# Patient Record
Sex: FEMALE | Race: WHITE | URBAN - METROPOLITAN AREA
[De-identification: names, ages, dates, MRNs, and addresses within clinical notes are randomized per-mention and may not be internally consistent; named-entity substitution may affect disease eponyms.]

---

## 2018-07-23 ENCOUNTER — OFFICE VISIT (OUTPATIENT)
Dept: DERMATOLOGY | Facility: CLINIC | Age: 53
End: 2018-07-23
Payer: COMMERCIAL

## 2018-07-23 VITALS — OXYGEN SATURATION: 99 % | HEART RATE: 59 BPM | SYSTOLIC BLOOD PRESSURE: 125 MMHG | DIASTOLIC BLOOD PRESSURE: 81 MMHG

## 2018-07-23 DIAGNOSIS — D23.30 FIBROUS PAPULE OF FACE: ICD-10-CM

## 2018-07-23 DIAGNOSIS — D22.9 MULTIPLE BENIGN NEVI: ICD-10-CM

## 2018-07-23 DIAGNOSIS — D48.5 NEOPLASM OF UNCERTAIN BEHAVIOR OF SKIN: Primary | ICD-10-CM

## 2018-07-23 DIAGNOSIS — R21 RASH AND OTHER NONSPECIFIC SKIN ERUPTION: ICD-10-CM

## 2018-07-23 PROCEDURE — 11101 HC BIOPSY SKIN/SUBQ/MUC MEM, EACH ADDTL LESION: CPT | Performed by: PHYSICIAN ASSISTANT

## 2018-07-23 PROCEDURE — 88305 TISSUE EXAM BY PATHOLOGIST: CPT | Mod: TC | Performed by: PHYSICIAN ASSISTANT

## 2018-07-23 PROCEDURE — 99203 OFFICE O/P NEW LOW 30 MIN: CPT | Mod: 25 | Performed by: PHYSICIAN ASSISTANT

## 2018-07-23 PROCEDURE — 11100 HC BIOPSY SKIN/SUBQ/MUC MEM, SINGLE LESION: CPT | Performed by: PHYSICIAN ASSISTANT

## 2018-07-23 RX ORDER — CEPHALEXIN 500 MG/1
500 CAPSULE ORAL
COMMUNITY
Start: 2018-07-16

## 2018-07-23 RX ORDER — ATENOLOL 50 MG/1
50 TABLET ORAL
COMMUNITY
Start: 2016-06-10

## 2018-07-23 NOTE — PROGRESS NOTES
HPI:  Natalie Molina is a 53 year old female patient here today for for rash on right elbow and right knee  Duration: 2 weeks  Symptoms:  Itchy and red. 3rd episode, lasts for 2-3 weeks.     Previous treatments: resolves with topical TMC and cephalexin x 2 weeks     Alleviating/aggravating factors: no additional    Associated symptoms: none  Additional findings:no change in products or vacations  Patient has no other skin complaints today.  Remainder of the HPI, Meds, PMH, Allergies, FH, and SH was reviewed in chart.      History reviewed. No pertinent past medical history.    History reviewed. No pertinent surgical history.     History reviewed. No pertinent family history.    Social History     Social History     Marital status:      Spouse name: N/A     Number of children: N/A     Years of education: N/A     Occupational History     Not on file.     Social History Main Topics     Smoking status: Never Smoker     Smokeless tobacco: Never Used     Alcohol use Not on file     Drug use: Not on file     Sexual activity: Not on file     Other Topics Concern     Not on file     Social History Narrative       Outpatient Encounter Prescriptions as of 7/23/2018   Medication Sig Dispense Refill     atenolol (TENORMIN) 50 MG tablet Take 50 mg by mouth       cephALEXin (KEFLEX) 500 MG capsule Take 500 mg by mouth       metFORMIN (GLUCOPHAGE) 500 MG tablet Take 500 mg by mouth       cyclobenzaprine (FLEXERIL) 10 MG tablet Take 1 tablet by mouth nightly as needed for muscle spasms. (Patient not taking: Reported on 7/23/2018) 30 tablet 0     hydrocodone-acetaminophen 5-325 MG per tablet Take  by mouth every 6 hours as needed for pain. ONE OR TWO (Patient not taking: Reported on 7/23/2018) 30 tablet 0     NO ACTIVE MEDICATIONS        No facility-administered encounter medications on file as of 7/23/2018.        Review Of Systems:  Skin: As above  Eyes: negative  Ears/Nose/Throat: negative  Respiratory: No shortness of  breath, dyspnea on exertion, cough, or hemoptysis  Cardiovascular: negative  Gastrointestinal: negative  Genitourinary: negative  Musculoskeletal: negative  Neurologic: negative  Psychiatric: negative  Hematologic/Lymphatic/Immunologic: negative  Endocrine: negative      Objective:     /81  Pulse 59  SpO2 99%  Eyes: Conjunctivae/lids: Normal   ENT: Lips:  Normal  MSK: Normal  Cardiovascular: Peripheral edema none  Pulm: Breathing Normal  Neuro/Psych: Orientation: Normal; Mood/Affect: Normal, NAD, WDWN  Following areas examined: face, groin, right and left UE  Leblanc skin type:l   Findings:    1) pink scaly patches on left medial elbow   2) flesh colored pedunculated soft nodule on left medial superior thigh 0.7  3-4) Well circumscribed brown and brownish papules with symmetric color distribution on face.  Assessment and Plan:  1) Rash and nonspecific skin eruption on right medial elbow  bx 0.4cm Contact derm vs arthropod bites vs urticaria  PUNCH BIOPSY:  After consent, anesthesia with LEC and prep, punch biopsy with a 4mm punch performed. Ethilon 4.0 green microfilament suture used for closure. No complications and routine wound care.  May grow back and will get a scar. Based on lesion type may need to completely remove lesion. Patient will be notified in 7-10 days of results. Wound care directions given. All questions were answered. One cruciate stitch for closure.   2) Neoplasm of uncertain behavior on left medial superior thigh 0.7cm  Rule out irritated acrochordon  TANGENTIAL BIOPSY:  After consent, anesthesia with LEC and prep, tangential biopsy performed.  No complications and routine wound care.  May grow back and will get a scar. Based on lesion type may need to completely remove lesion. Patient will be notified in 7-10 days of results. Wound care directions given.  3-4) multiple benign nevi and fibrous papule  Treatment of these lesions would be purely cosmetic and not medically neccessary.    Different removal options including excision, cryotherapy, cautery and /or laser.      Follow up in yearly FBE

## 2018-07-23 NOTE — MR AVS SNAPSHOT
After Visit Summary   7/23/2018    Natalie Molina    MRN: [id number]           Patient Information     Date Of Birth          1965        Visit Information        Provider Department      7/23/2018 3:00 PM Virginia Up PA-C [facility]        Today's Diagnoses     Neoplasm of uncertain behavior of skin    -  1      Care Instructions                 Wound Care Instructions     FOR SUPERFICIAL WOUNDS     [facility] [phone]    [facility][1] [phone]          AFTER 24 HOURS YOU SHOULD REMOVE THE BANDAGE AND BEGIN DAILY DRESSING CHANGES AS FOLLOWS:     1) Remove Dressing.     2) Clean and dry the area with tap water using a Q-tip or sterile gauze pad.     3) Apply Vaseline, Aquaphor, Polysporin ointment or Bacitracin ointment over entire wound.  Do NOT use Neosporin ointment.     4) Cover the wound with a band-aid, or a sterile non-stick gauze pad and micropore paper tape      REPEAT THESE INSTRUCTIONS AT LEAST ONCE A DAY UNTIL THE WOUND HAS COMPLETELY HEALED.    It is an old wives tale that a wound heals better when it is exposed to air and allowed to dry out. The wound will heal faster with a better cosmetic result if it is kept moist with ointment and covered with a bandage.    **Do not let the wound dry out.**      Supplies Needed:      *Cotton tipped applicators (Q-tips)    *Polysporin Ointment or Bacitracin Ointment (NOT NEOSPORIN)    *Band-aids or non-stick gauze pads and micropore paper tape.      PATIENT INFORMATION:    During the healing process you will notice a number of changes. All wounds develop a small halo of redness surrounding the wound.  This means healing is occurring. Severe itching with extensive redness usually indicates sensitivity to the ointment or bandage tape used to dress the wound.  You should call our office if this develops.      Swelling  and/or discoloration around your surgical site is common,  particularly when performed around the eye.    All wounds normally drain.  The larger the wound the more drainage there will be.  After 7-10 days, you will notice the wound beginning to shrink and new skin will begin to grow.  The wound is healed when you can see skin has formed over the entire area.  A healed wound has a healthy, shiny look to the surface and is red to dark pink in color to normalize.  Wounds may take approximately 4-6 weeks to heal.  Larger wounds may take 6-8 weeks.  After the wound is healed you may discontinue dressing changes.    You may experience a sensation of tightness as your wound heals. This is normal and will gradually subside.    Your healed wound may be sensitive to temperature changes. This sensitivity improves with time, but if you re having a lot of discomfort, try to avoid temperature extremes.    Patients frequently experience itching after their wound appears to have healed because of the continue healing under the skin.  Plain Vaseline will help relieve the itching.        POSSIBLE COMPLICATIONS    BLEEDIN. Leave the bandage in place.  2. Use tightly rolled up gauze or a cloth to apply direct pressure over the bandage for 30  minutes.  3. Reapply pressure for an additional 30 minutes if necessary  4. Use additional gauze and tape to maintain pressure once the bleeding has stopped.                  Follow-ups after your visit        Who to contact     If you have questions or need follow up information about today's clinic visit or your schedule please contact [facility] directly at [phone].  Normal or non-critical lab and imaging results will be communicated to you by MyChart, letter or phone within 4 business days after the clinic has received the results. If you do not hear from us within 7 days, please contact the clinic through MyChart or phone. If you have a critical or abnormal lab result, we will notify you by phone as soon as  possible.  Submit refill requests through [email or web address] or call your pharmacy and they will forward the refill request to us. Please allow 3 business days for your refill to be completed.          Additional Information About Your Visit        Care EveryWhere ID     This is your Care EveryWhere ID. This could be used by other organizations to access your [facility] medical records  [id number]        Your Vitals Were     Pulse Pulse Oximetry                59 99%           Blood Pressure from Last 3 Encounters:   07/23/18 125/81    Weight from Last 3 Encounters:   No data found for Wt              We Performed the Following     BIOPSY SKIN/SUBQ/MUC MEM, EACH ADDTL LESION     BIOPSY SKIN/SUBQ/MUC MEM, SINGLE LESION     Dermatological path order and indications        Primary Care Provider Office Phone # Fax #    [facility] [phone]       [address] [place] [address]        Equal Access to Services     ALEAH CHAVEZ : Hadii bryant delgado hadasho Soomaali, waaxda luqadaha, qaybta kaalmada adeegyada, waxcharline tuttle . So [facility] [phone].    ATENCIÓN: Si habla español, tiene a hanson disposición servicios gratuitos de asistencia lingüística. Elsa al [phone].    We comply with applicable federal civil rights laws and [place] laws. We do not discriminate on the basis of race, color, national origin, age, disability, sex, sexual orientation, or gender identity.            Thank you!     Thank you for choosing [facility]  for your care. Our goal is always to provide you with excellent care. Hearing back from our patients is one way we can continue to improve our services. Please take a few minutes to complete the written survey that you may receive in the mail after your visit with us. Thank you!             Your Updated Medication List - Protect others around you: Learn how to safely use, store and throw away your medicines at  www.disposemymeds.org.          This list is accurate as of 7/23/18  3:36 PM.  Always use your most recent med list.                   Brand Name Dispense Instructions for use Diagnosis    atenolol 50 MG tablet    TENORMIN     Take 50 mg by mouth    Neoplasm of uncertain behavior of skin       cephALEXin 500 MG capsule    KEFLEX     Take 500 mg by mouth    Neoplasm of uncertain behavior of skin       cyclobenzaprine 10 MG tablet    FLEXERIL    30 tablet    Take 1 tablet by mouth nightly as needed for muscle spasms.    Neck pain       HYDROcodone-acetaminophen 5-325 MG per tablet    NORCO    30 tablet    Take  by mouth every 6 hours as needed for pain. ONE OR TWO    Neck pain       metFORMIN 500 MG tablet    GLUCOPHAGE     Take 500 mg by mouth    Neoplasm of uncertain behavior of skin       NO ACTIVE MEDICATIONS

## 2018-07-23 NOTE — PATIENT INSTRUCTIONS
Wound Care Instructions     FOR SUPERFICIAL WOUNDS     [facility] [phone]    [facility] [phone]          AFTER 24 HOURS YOU SHOULD REMOVE THE BANDAGE AND BEGIN DAILY DRESSING CHANGES AS FOLLOWS:     1) Remove Dressing.     2) Clean and dry the area with tap water using a Q-tip or sterile gauze pad.     3) Apply Vaseline, Aquaphor, Polysporin ointment or Bacitracin ointment over entire wound.  Do NOT use Neosporin ointment.     4) Cover the wound with a band-aid, or a sterile non-stick gauze pad and micropore paper tape      REPEAT THESE INSTRUCTIONS AT LEAST ONCE A DAY UNTIL THE WOUND HAS COMPLETELY HEALED.    It is an old wives tale that a wound heals better when it is exposed to air and allowed to dry out. The wound will heal faster with a better cosmetic result if it is kept moist with ointment and covered with a bandage.    **Do not let the wound dry out.**      Supplies Needed:      *Cotton tipped applicators (Q-tips)    *Polysporin Ointment or Bacitracin Ointment (NOT NEOSPORIN)    *Band-aids or non-stick gauze pads and micropore paper tape.      PATIENT INFORMATION:    During the healing process you will notice a number of changes. All wounds develop a small halo of redness surrounding the wound.  This means healing is occurring. Severe itching with extensive redness usually indicates sensitivity to the ointment or bandage tape used to dress the wound.  You should call our office if this develops.      Swelling  and/or discoloration around your surgical site is common, particularly when performed around the eye.    All wounds normally drain.  The larger the wound the more drainage there will be.  After 7-10 days, you will notice the wound beginning to shrink and new skin will begin to grow.  The wound is healed when you can see skin has formed over the entire area.  A healed wound has a healthy, shiny look to the surface and is red to dark pink in color to  normalize.  Wounds may take approximately 4-6 weeks to heal.  Larger wounds may take 6-8 weeks.  After the wound is healed you may discontinue dressing changes.    You may experience a sensation of tightness as your wound heals. This is normal and will gradually subside.    Your healed wound may be sensitive to temperature changes. This sensitivity improves with time, but if you re having a lot of discomfort, try to avoid temperature extremes.    Patients frequently experience itching after their wound appears to have healed because of the continue healing under the skin.  Plain Vaseline will help relieve the itching.        POSSIBLE COMPLICATIONS    BLEEDIN. Leave the bandage in place.  2. Use tightly rolled up gauze or a cloth to apply direct pressure over the bandage for 30  minutes.  3. Reapply pressure for an additional 30 minutes if necessary  4. Use additional gauze and tape to maintain pressure once the bleeding has stopped.

## 2018-07-23 NOTE — LETTER
7/23/2018         RE: Natalie Molina  [address]  [place] [facility] [address]        Dear Colleague,    Thank you for referring your patient, Natalie Molina, to the [facility]. Please see a copy of my visit note below.    HPI:  Natalie Molina is a 53 year old female patient here today for for rash on right elbow and right knee  Duration: 2 weeks  Symptoms:  Itchy and red. 3rd episode, lasts for 2-3 weeks.     Previous treatments: resolves with topical TMC and cephalexin x 2 weeks     Alleviating/aggravating factors: no additional    Associated symptoms: none  Additional findings:no change in products or vacations  Patient has no other skin complaints today.  Remainder of the HPI, Meds, PMH, Allergies, FH, and SH was reviewed in chart.      History reviewed. No pertinent past medical history.    History reviewed. No pertinent surgical history.     History reviewed. No pertinent family history.    Social History     Social History     Marital status:      Spouse name: N/A     Number of children: N/A     Years of education: N/A     Occupational History     Not on file.     Social History Main Topics     Smoking status: Never Smoker     Smokeless tobacco: Never Used     Alcohol use Not on file     Drug use: Not on file     Sexual activity: Not on file     Other Topics Concern     Not on file     Social History Narrative       Outpatient Encounter Prescriptions as of 7/23/2018   Medication Sig Dispense Refill     atenolol (TENORMIN) 50 MG tablet Take 50 mg by mouth       cephALEXin (KEFLEX) 500 MG capsule Take 500 mg by mouth       metFORMIN (GLUCOPHAGE) 500 MG tablet Take 500 mg by mouth       cyclobenzaprine (FLEXERIL) 10 MG tablet Take 1 tablet by mouth nightly as needed for muscle spasms. (Patient not taking: Reported on 7/23/2018) 30 tablet 0     hydrocodone-acetaminophen 5-325 MG per tablet Take  by mouth every 6 hours as needed for pain. ONE OR TWO (Patient not taking:  Reported on 7/23/2018) 30 tablet 0     NO ACTIVE MEDICATIONS        No facility-administered encounter medications on file as of 7/23/2018.        Review Of Systems:  Skin: As above  Eyes: negative  Ears/Nose/Throat: negative  Respiratory: No shortness of breath, dyspnea on exertion, cough, or hemoptysis  Cardiovascular: negative  Gastrointestinal: negative  Genitourinary: negative  Musculoskeletal: negative  Neurologic: negative  Psychiatric: negative  Hematologic/Lymphatic/Immunologic: negative  Endocrine: negative      Objective:     /81  Pulse 59  SpO2 99%  Eyes: Conjunctivae/lids: Normal   ENT: Lips:  Normal  MSK: Normal  Cardiovascular: Peripheral edema none  Pulm: Breathing Normal  Neuro/Psych: Orientation: Normal; Mood/Affect: Normal, NAD, WDWN  Following areas examined: face, groin, right and left UE  Leblanc skin type:l   Findings:    1) pink scaly patches on left medial elbow   2) flesh colored pedunculated soft nodule on left medial superior thigh 0.7  3-4) Well circumscribed brown and brownish papules with symmetric color distribution on face.  Assessment and Plan:  1) Rash and nonspecific skin eruption on right medial elbow  bx 0.4cm Contact derm vs arthropod bites vs urticaria  PUNCH BIOPSY:  After consent, anesthesia with LEC and prep, punch biopsy with a 4mm punch performed. Ethilon 4.0 green microfilament suture used for closure. No complications and routine wound care.  May grow back and will get a scar. Based on lesion type may need to completely remove lesion. Patient will be notified in 7-10 days of results. Wound care directions given. All questions were answered. One cruciate stitch for closure.   2) Neoplasm of uncertain behavior  irrigated acrochordon  3-4) multiple benign nevi and fibrous papule  Treatment of these lesions would be purely cosmetic and not medically neccessary.   Different removal options including excision, cryotherapy, cautery and /or laser.      Follow up in  yearly FBE      Again, thank you for allowing me to participate in the care of your patient.        Sincerely,        Virginia Up PA-C

## 2018-07-29 LAB — COPATH REPORT: NORMAL

## 2018-07-30 ENCOUNTER — ALLIED HEALTH/NURSE VISIT (OUTPATIENT)
Dept: DERMATOLOGY | Facility: CLINIC | Age: 53
End: 2018-07-30
Payer: COMMERCIAL

## 2018-07-30 DIAGNOSIS — Z48.01 ENCOUNTER FOR CHANGE OR REMOVAL OF SURGICAL WOUND DRESSING: Primary | ICD-10-CM

## 2018-07-30 PROCEDURE — 99207 ZZC NO CHARGE NURSE ONLY: CPT

## 2018-07-30 NOTE — NURSING NOTE
Pt returned to clinic for post surgery 1 week follow up bandage change. Pt has no complaints, denies pain. Suture removed from right elbow, area cleansed with normal saline. Site is healing and wound edges approximating well. Applied Aquaphor and bandage.    Advised to watch for signs/sx of infection; spreading redness, drainage, odor, fever. Call or report promptly to clinic. Pt given written instructions and informed to rtc as needed. Patient verbalized understanding.     HERBERTH Helms-BSN  [facility]  [phone]

## 2018-07-30 NOTE — MR AVS SNAPSHOT
After Visit Summary   7/30/2018    Natalie Molina    MRN: [id number]           Patient Information     Date Of Birth          1965        Visit Information        Provider Department      7/30/2018 3:00 PM OX DERM NURSE [facility]        Care Instructions    WOUND CARE INSTRUCTIONS  for  SUTURE REMOVAL      If there are any open or bleeding areas at the incision site you should begin to cover the area with a bandage daily as follows:    1) Clean and dry the area with plain tap water using a Q-tip or sterile gauze pad.  2) Apply Vaseline, Aquaphor, Polysporin or Bacitracin ointment to the open area.  3) Cover the wound with a band-aid or a sterile non-stick gauze pad and micropore paper tape.       *Once the bandages are removed, the scar will be red and firm (especially in the lip/chin area). This is normal and will fade in time. It might take 6-12 months for this to happen.     *Massaging the area will help the scar soften and fade quicker. Begin to massage the area in one month. To massage apply pressure directly and firmly over the scar with the fingertips and move in a circular motion. Massage the area for a few minutes several times a day. Continue to massage the site for several months.    *Numbness in the surgical area is expected. It might take 12-18 months for the feeling to return to normal. During this time sensations of itchiness, tingling and occasional sharp pains might be noted. These feelings are normal and will subside once the nerves have completely healed.           Follow-ups after your visit        Your next 10 appointments already scheduled     Jul 30, 2018  3:00 PM CDT   Nurse Only with OX DERM NURSE   [facility]    [address] [address] [phone]              Who to contact     If you have questions or need follow up information about today's clinic visit  or your schedule please contact [facility] directly at [phone].  Normal or non-critical lab and imaging results will be communicated to you by MyChart, letter or phone within 4 business days after the clinic has received the results. If you do not hear from us within 7 days, please contact the clinic through MyChart or phone. If you have a critical or abnormal lab result, we will notify you by phone as soon as possible.  Submit refill requests through [employer] or call your pharmacy and they will forward the refill request to us. Please allow 3 business days for your refill to be completed.          Additional Information About Your Visit        Care EveryWhere ID     This is your Care EveryWhere ID. This could be used by other organizations to access your [facility] medical records  [id number]         Blood Pressure from Last 3 Encounters:   07/23/18 125/81    Weight from Last 3 Encounters:   No data found for Wt              Today, you had the following     No orders found for display       Primary Care Provider Office Phone # Fax #    [facility] [phone]       [address] [place] [address]        Equal Access to Services     ALEAH CHAVEZ : Hadii aad ku hadasho Soamairani, waaxda luqadaha, qaybta kaalmada moi, noemí tuttle . So [facility] [phone].    ATENCIÓN: Si habla español, tiene a hanson disposición servicios gratuitos de asistencia lingüística. Freedomame al [phone].    We comply with applicable federal civil rights laws and [place] laws. We do not discriminate on the basis of race, color, national origin, age, disability, sex, sexual orientation, or gender identity.            Thank you!     Thank you for choosing [facility]  for your care. Our goal is always to provide you with excellent care. Hearing back from our patients is one way we can continue to improve our services. Please  take a few minutes to complete the written survey that you may receive in the mail after your visit with us. Thank you!             Your Updated Medication List - Protect others around you: Learn how to safely use, store and throw away your medicines at www.disposemymeds.org.          This list is accurate as of 7/30/18  2:51 PM.  Always use your most recent med list.                   Brand Name Dispense Instructions for use Diagnosis    atenolol 50 MG tablet    TENORMIN     Take 50 mg by mouth    Neoplasm of uncertain behavior of skin       cephALEXin 500 MG capsule    KEFLEX     Take 500 mg by mouth    Neoplasm of uncertain behavior of skin       cyclobenzaprine 10 MG tablet    FLEXERIL    30 tablet    Take 1 tablet by mouth nightly as needed for muscle spasms.    Neck pain       HYDROcodone-acetaminophen 5-325 MG per tablet    NORCO    30 tablet    Take  by mouth every 6 hours as needed for pain. ONE OR TWO    Neck pain       metFORMIN 500 MG tablet    GLUCOPHAGE     Take 500 mg by mouth    Neoplasm of uncertain behavior of skin       NO ACTIVE MEDICATIONS

## 2018-07-31 ENCOUNTER — TELEPHONE (OUTPATIENT)
Dept: DERMATOLOGY | Facility: CLINIC | Age: 53
End: 2018-07-31

## 2018-07-31 NOTE — TELEPHONE ENCOUNTER
Called and LM for patient to call back in regards to biopsy results navjot Helms RN-BSN  [facility]  [phone]

## 2018-07-31 NOTE — TELEPHONE ENCOUNTER
Called and spoke to patient. 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 Informed patient we can treat with an oral antifungal for the hypersensitivity reaction-patient stated she would like to think about it. Advised patient to call back if she would like to proceed, patient voiced understanding.    HERBERTH Helms-BSN  [facility]  [phone]

## 2018-07-31 NOTE — TELEPHONE ENCOUNTER
Notes Recorded by Virginia Up PA-C on 7/30/2018 at 7:09 PM  A. 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 It is often difficult to treat as the agent causing the rash cannot be identified. We can treat with an oral antifungal for a few months. I would need to do labs first to check liver function as this medication is cleared through the liver. The rash can at times resolve on its own. However, this is not always the case. Please let me know if you would like to start the oral medication. We can talk more at our follow up if you would like to wait until then to start treatment.       B. Skin, left medial superior thigh:   - Fibroepithelial polyp   ---benign skin tag. No additional tx needed.

## 2018-08-14 ENCOUNTER — TELEPHONE (OUTPATIENT)
Dept: DERMATOLOGY | Facility: CLINIC | Age: 53
End: 2018-08-14

## 2018-08-14 NOTE — TELEPHONE ENCOUNTER
Patient called in. Patient never got the additional lab draw that was requested at the end of July. Patient stated she is now having a flare up on her left arm- itchy/red circles size of rosa, now size of half dollar . Patient wants to know if she should come in. Patient stopped drinking pop and started drinking lemon water, thinks she may be allergic to the lemon water. Patient has not started using and new soaps or lotions. Informed patient I would let provider know and get back to her. Patient voiced understanding.    Scooter, RN-BSN  [facility]  [phone]

## 2018-08-15 NOTE — TELEPHONE ENCOUNTER
Called and LM for patient to call back in regards to providers notes.    Scooter RN-BSN  [facility]  [phone]

## 2018-08-15 NOTE — TELEPHONE ENCOUNTER
Patient called back. Informed patient that based on her symptoms provider would like her to come in for a f/u appointment. Scheduled future f/u appointment. Patient voiced understanding.    Scooter RN-BSN  [facility]  [phone]

## 2018-08-16 ENCOUNTER — OFFICE VISIT (OUTPATIENT)
Dept: FAMILY MEDICINE | Facility: CLINIC | Age: 53
End: 2018-08-16
Payer: COMMERCIAL

## 2018-08-16 VITALS — HEART RATE: 67 BPM | DIASTOLIC BLOOD PRESSURE: 75 MMHG | OXYGEN SATURATION: 98 % | SYSTOLIC BLOOD PRESSURE: 115 MMHG

## 2018-08-16 DIAGNOSIS — L29.9 LOCALIZED PRURITUS: ICD-10-CM

## 2018-08-16 DIAGNOSIS — L53.1 ERYTHEMA ANNULARE CENTRIFUGUM: Primary | ICD-10-CM

## 2018-08-16 LAB
ERYTHROCYTE [DISTWIDTH] IN BLOOD BY AUTOMATED COUNT: 14.5 % (ref 10–15)
HCT VFR BLD AUTO: 40.7 % (ref 35–47)
HGB BLD-MCNC: 13.2 G/DL (ref 11.7–15.7)
MCH RBC QN AUTO: 27 PG (ref 26.5–33)
MCHC RBC AUTO-ENTMCNC: 32.4 G/DL (ref 31.5–36.5)
MCV RBC AUTO: 83 FL (ref 78–100)
PLATELET # BLD AUTO: 306 10E9/L (ref 150–450)
RBC # BLD AUTO: 4.88 10E12/L (ref 3.8–5.2)
WBC # BLD AUTO: 10.4 10E9/L (ref 4–11)

## 2018-08-16 PROCEDURE — 36415 COLL VENOUS BLD VENIPUNCTURE: CPT | Performed by: PHYSICIAN ASSISTANT

## 2018-08-16 PROCEDURE — 99213 OFFICE O/P EST LOW 20 MIN: CPT | Performed by: PHYSICIAN ASSISTANT

## 2018-08-16 PROCEDURE — 85027 COMPLETE CBC AUTOMATED: CPT | Performed by: PHYSICIAN ASSISTANT

## 2018-08-16 PROCEDURE — 80053 COMPREHEN METABOLIC PANEL: CPT | Performed by: PHYSICIAN ASSISTANT

## 2018-08-16 RX ORDER — TERBINAFINE HYDROCHLORIDE 250 MG/1
250 TABLET ORAL DAILY
Qty: 90 TABLET | Refills: 0 | Status: SHIPPED | OUTPATIENT
Start: 2018-08-16

## 2018-08-16 RX ORDER — FLUOCINONIDE 0.5 MG/G
CREAM TOPICAL
Qty: 60 G | Refills: 0 | Status: SHIPPED | OUTPATIENT
Start: 2018-08-16

## 2018-08-16 NOTE — PATIENT INSTRUCTIONS
Begin Terbinafine daily  Apply Lidex a thin layer to affected are 2x a day for 1-2 weeks as needed for itch tapering with improvement.     Labs today.    Follow up in 6 weeks.

## 2018-08-16 NOTE — LETTER
8/16/2018         RE: Natalie Molina  [address]  [address] [facility] [address]        Dear Colleague,    Thank you for referring your patient, Natalie Molina, to the [facility]. Please see a copy of my visit note below.    HPI:  Natalie Molina is a 53 year old female patient here today for follow up bx proven erythema annulare centrifugum .  Patient states this has been present for 8 months. Has had four flares so far. Is currently on her 4th flare. Started metformin in January for DM.  Patient reports the following symptoms:  Mild itch .  Patient reports the following previous treatmentsTMC with some improvement of itch.  Patient reports the following modifying factors none.  Associated symptoms: none.  Patient has no other skin complaints today.  Remainder of the HPI, Meds, PMH, Allergies, FH, and SH was reviewed in chart.      History reviewed. No pertinent past medical history.    History reviewed. No pertinent surgical history.     History reviewed. No pertinent family history.    Social History     Social History     Marital status:      Spouse name: N/A     Number of children: N/A     Years of education: N/A     Occupational History     Not on file.     Social History Main Topics     Smoking status: Never Smoker     Smokeless tobacco: Never Used     Alcohol use Not on file     Drug use: Not on file     Sexual activity: Not on file     Other Topics Concern     Not on file     Social History Narrative       Outpatient Encounter Prescriptions as of 8/16/2018   Medication Sig Dispense Refill     atenolol (TENORMIN) 50 MG tablet Take 50 mg by mouth       metFORMIN (GLUCOPHAGE) 500 MG tablet Take 500 mg by mouth       cephALEXin (KEFLEX) 500 MG capsule Take 500 mg by mouth       cyclobenzaprine (FLEXERIL) 10 MG tablet Take 1 tablet by mouth nightly as needed for muscle spasms. (Patient not taking: Reported on 7/23/2018) 30 tablet 0     hydrocodone-acetaminophen 5-325 MG per tablet  Take  by mouth every 6 hours as needed for pain. ONE OR TWO (Patient not taking: Reported on 7/23/2018) 30 tablet 0     NO ACTIVE MEDICATIONS        No facility-administered encounter medications on file as of 8/16/2018.        Review Of Systems:  Skin: As above  Eyes: negative  Ears/Nose/Throat: negative  Respiratory: No shortness of breath, dyspnea on exertion, cough, or hemoptysis  Cardiovascular: negative  Gastrointestinal: negative  Genitourinary: negative  Musculoskeletal: negative  Neurologic: negative  Psychiatric: negative  Hematologic/Lymphatic/Immunologic: negative  Endocrine: negative      Objective:     /75  Pulse 67  SpO2 98%  Eyes: Conjunctivae/lids: Normal   ENT: Lips:  Normal  MSK: Normal  Cardiovascular: Peripheral edema none  Pulm: Breathing Normal  Neuro/Psych: Orientation: Normal; Mood/Affect: Normal, NAD, WDWN  Pt accompanied by: self  Following areas examined: face, neck, UE, feet and distal legs  Leblanc skin type:i   Findings:  1) light pink WD plaque with raised red borders on left arm   Generalized flaking of skin of feet and distal legs.   Assessment and Plan:  1) bx proven erythema annulare centrifugum vs drug eruption vs viral exanthem with pruritis    Disc doing a trial off of metformin. Contact PCP to change medication. Can do this or can start an oral antifungal as tinea is the most common cause of erythema annulare centrifugum.  Begin Terbinafine 250 mg daily x 6 weeks  Apply Lidex a thin layer to affected are 2x a day for 1-2 weeks as needed for itch tapering with improvement.   Discussed side effects of topical steroids including but not limited to atrophy (skin thinning), striae (stretch marks) telangiectasias, steroid acne, and others. Do not apply to normal skin. Do not apply to discolored skin that does not have rash present. Educated patient on post inflammatory hyperpigmentation.     Labs today-CBC and CMP    Follow up in 6 weeks.           Again, thank you for  allowing me to participate in the care of your patient.        Sincerely,        Virginia Up PA-C

## 2018-08-16 NOTE — MR AVS SNAPSHOT
After Visit Summary   8/16/2018    Natalie Molina    MRN: [id number]           Patient Information     Date Of Birth          1965        Visit Information        Provider Department      8/16/2018 3:40 PM Virginia Up PA-C [facility]        Today's Diagnoses     Erythema annulare centrifugum    -  1    Localized pruritus          Care Instructions    Begin Terbinafine daily  Apply Lidex a thin layer to affected are 2x a day for 1-2 weeks as needed for itch tapering with improvement.     Labs today.    Follow up in 6 weeks.           Follow-ups after your visit        Who to contact     If you have questions or need follow up information about today's clinic visit or your schedule please contact [facility] directly at [phone].  Normal or non-critical lab and imaging results will be communicated to you by MyChart, letter or phone within 4 business days after the clinic has received the results. If you do not hear from us within 7 days, please contact the clinic through MyChart or phone. If you have a critical or abnormal lab result, we will notify you by phone as soon as possible.  Submit refill requests through [facility] or call your pharmacy and they will forward the refill request to us. Please allow 3 business days for your refill to be completed.          Additional Information About Your Visit        Care EveryWhere ID     This is your Care EveryWhere ID. This could be used by other organizations to access your [place] medical records  [id number]        Your Vitals Were     Pulse Pulse Oximetry                67 98%           Blood Pressure from Last 3 Encounters:   08/16/18 115/75   07/23/18 125/81    Weight from Last 3 Encounters:   No data found for Wt              Today, you had the following     No orders found for display         Today's Medication Changes          These changes are accurate as of 8/16/18  3:40 PM.  If you have any  questions, ask your nurse or doctor.               Start taking these medicines.        Dose/Directions    fluocinonide 0.05 % cream   Commonly known as:  LIDEX   Used for:  Localized pruritus   Started by:  Virginia Up PA-C        Apply a thin layer to affected area on left arm BID x 1-2 weeks.   Quantity:  60 g   Refills:  0            Where to get your medicines      These medications were sent to [facility] [address] [place] [address] & [address] [address]     Phone:  [phone]     fluocinonide 0.05 % cream                Primary Care Provider Office Phone # Fax #    [facility] [phone]       [address] [place] [address]        Equal Access to Services     ALEAH CHAVEZ : Hadii bryant delgado hadasho Soomaali, waaxda luqadaha, qaybta kaalmada adeegyada, waxay malika shetty. So [facility] [phone].    ATENCIÓN: Si habla español, tiene a hanson disposición servicios gratuitos de asistencia lingüística. Elsa al [phone].    We comply with applicable federal civil rights laws and [place] laws. We do not discriminate on the basis of race, color, national origin, age, disability, sex, sexual orientation, or gender identity.            Thank you!     [facility] you [facility] your [facility]. Our goal is always to provide you with excellent care. Hearing back from our patients is one way we can continue to improve our services. Please take a few minutes to complete the written survey that you may receive in the mail after your visit with us. Thank you!             Your Updated Medication List - Protect others around you: Learn how to safely use, store and throw away your medicines at www.disposemymeds.org.          This list is accurate as of 8/16/18  3:40 PM.  Always use your most recent med list.                   Brand Name Dispense  Instructions for use Diagnosis    atenolol 50 MG tablet    TENORMIN     Take 50 mg by mouth    Neoplasm of uncertain behavior of skin       cephALEXin 500 MG capsule    KEFLEX     Take 500 mg by mouth    Neoplasm of uncertain behavior of skin       cyclobenzaprine 10 MG tablet    FLEXERIL    30 tablet    Take 1 tablet by mouth nightly as needed for muscle spasms.    Neck pain       fluocinonide 0.05 % cream    LIDEX    60 g    Apply a thin layer to affected area on left arm BID x 1-2 weeks.    Localized pruritus       HYDROcodone-acetaminophen 5-325 MG per tablet    NORCO    30 tablet    Take  by mouth every 6 hours as needed for pain. ONE OR TWO    Neck pain       metFORMIN 500 MG tablet    GLUCOPHAGE     Take 500 mg by mouth    Neoplasm of uncertain behavior of skin       NO ACTIVE MEDICATIONS

## 2018-08-16 NOTE — PROGRESS NOTES
HPI:  Natalie Molina is a 53 year old female patient here today for follow up bx proven erythema annulare centrifugum .  Patient states this has been present for 8 months. Has had four flares so far. Is currently on her 4th flare. Started metformin in January for DM.  Patient reports the following symptoms:  Mild itch .  Patient reports the following previous treatmentsTMC with some improvement of itch.  Patient reports the following modifying factors none.  Associated symptoms: none.  Patient has no other skin complaints today.  Remainder of the HPI, Meds, PMH, Allergies, FH, and SH was reviewed in chart.      History reviewed. No pertinent past medical history.    History reviewed. No pertinent surgical history.     History reviewed. No pertinent family history.    Social History     Social History     Marital status:      Spouse name: N/A     Number of children: N/A     Years of education: N/A     Occupational History     Not on file.     Social History Main Topics     Smoking status: Never Smoker     Smokeless tobacco: Never Used     Alcohol use Not on file     Drug use: Not on file     Sexual activity: Not on file     Other Topics Concern     Not on file     Social History Narrative       Outpatient Encounter Prescriptions as of 8/16/2018   Medication Sig Dispense Refill     atenolol (TENORMIN) 50 MG tablet Take 50 mg by mouth       metFORMIN (GLUCOPHAGE) 500 MG tablet Take 500 mg by mouth       cephALEXin (KEFLEX) 500 MG capsule Take 500 mg by mouth       cyclobenzaprine (FLEXERIL) 10 MG tablet Take 1 tablet by mouth nightly as needed for muscle spasms. (Patient not taking: Reported on 7/23/2018) 30 tablet 0     hydrocodone-acetaminophen 5-325 MG per tablet Take  by mouth every 6 hours as needed for pain. ONE OR TWO (Patient not taking: Reported on 7/23/2018) 30 tablet 0     NO ACTIVE MEDICATIONS        No facility-administered encounter medications on file as of 8/16/2018.        Review Of  Systems:  Skin: As above  Eyes: negative  Ears/Nose/Throat: negative  Respiratory: No shortness of breath, dyspnea on exertion, cough, or hemoptysis  Cardiovascular: negative  Gastrointestinal: negative  Genitourinary: negative  Musculoskeletal: negative  Neurologic: negative  Psychiatric: negative  Hematologic/Lymphatic/Immunologic: negative  Endocrine: negative      Objective:     /75  Pulse 67  SpO2 98%  Eyes: Conjunctivae/lids: Normal   ENT: Lips:  Normal  MSK: Normal  Cardiovascular: Peripheral edema none  Pulm: Breathing Normal  Neuro/Psych: Orientation: Normal; Mood/Affect: Normal, NAD, WDWN  Pt accompanied by: self  Following areas examined: face, neck, UE, feet and distal legs  Leblanc skin type:i   Findings:  1) light pink WD plaque with raised red borders on left arm   Generalized flaking of skin of feet and distal legs.   Assessment and Plan:  1) bx proven erythema annulare centrifugum vs drug eruption vs viral exanthem with pruritis    Disc doing a trial off of metformin. Contact PCP to change medication. Can do this or can start an oral antifungal as tinea is the most common cause of erythema annulare centrifugum.  Begin Terbinafine 250 mg daily x 6 weeks  Apply Lidex a thin layer to affected are 2x a day for 1-2 weeks as needed for itch tapering with improvement.   Discussed side effects of topical steroids including but not limited to atrophy (skin thinning), striae (stretch marks) telangiectasias, steroid acne, and others. Do not apply to normal skin. Do not apply to discolored skin that does not have rash present. Educated patient on post inflammatory hyperpigmentation.     Labs today-CBC and CMP    Follow up in 6 weeks.

## 2018-08-17 ENCOUNTER — TELEPHONE (OUTPATIENT)
Dept: FAMILY MEDICINE | Facility: CLINIC | Age: 53
End: 2018-08-17

## 2018-08-17 LAB
ALBUMIN SERPL-MCNC: 3.8 G/DL (ref 3.4–5)
ALP SERPL-CCNC: 61 U/L (ref 40–150)
ALT SERPL W P-5'-P-CCNC: 23 U/L (ref 0–50)
ANION GAP SERPL CALCULATED.3IONS-SCNC: 7 MMOL/L (ref 3–14)
AST SERPL W P-5'-P-CCNC: 25 U/L (ref 0–45)
BILIRUB SERPL-MCNC: 0.4 MG/DL (ref 0.2–1.3)
BUN SERPL-MCNC: 19 MG/DL (ref 7–30)
CALCIUM SERPL-MCNC: 9 MG/DL (ref 8.5–10.1)
CHLORIDE SERPL-SCNC: 102 MMOL/L (ref 94–109)
CO2 SERPL-SCNC: 31 MMOL/L (ref 20–32)
CREAT SERPL-MCNC: 0.73 MG/DL (ref 0.52–1.04)
GFR SERPL CREATININE-BSD FRML MDRD: 84 ML/MIN/1.7M2
GLUCOSE SERPL-MCNC: 89 MG/DL (ref 70–99)
POTASSIUM SERPL-SCNC: 3.7 MMOL/L (ref 3.4–5.3)
PROT SERPL-MCNC: 7.2 G/DL (ref 6.8–8.8)
SODIUM SERPL-SCNC: 140 MMOL/L (ref 133–144)

## 2018-08-17 NOTE — TELEPHONE ENCOUNTER
Left message on answering machine for patient to call back.    Notes Recorded by Virginia Up PA-C on 8/17/2018 at 9:01 AM  Blood work looks good to start oral antifungal.    Haydee Johnson,RN BSN  [facility] [place] [facility]  [phone]

## 2018-08-17 NOTE — TELEPHONE ENCOUNTER
Patient notified of test results and providers message, patient has no further questions.    Haydee POLLOCKRN BSN  [facility]  [phone]

## 2018-08-17 NOTE — TELEPHONE ENCOUNTER
Reason for Call:  Other returning call    Detailed comments: Pt returning call    Phone Number Patient can be reached at: Work number on file:  [phone] (work)    Best Time: anytime    Can we leave a detailed message on this number? NO    Call taken on 8/17/2018 at 11:33 AM by Lauren Hernández